# Patient Record
Sex: MALE | Race: WHITE | NOT HISPANIC OR LATINO | Employment: FULL TIME | URBAN - METROPOLITAN AREA
[De-identification: names, ages, dates, MRNs, and addresses within clinical notes are randomized per-mention and may not be internally consistent; named-entity substitution may affect disease eponyms.]

---

## 2024-03-22 ENCOUNTER — CLINICAL SUPPORT (OUTPATIENT)
Dept: AUDIOLOGY | Facility: CLINIC | Age: 59
End: 2024-03-22
Payer: COMMERCIAL

## 2024-03-22 DIAGNOSIS — H90.3 SENSORINEURAL HEARING LOSS (SNHL) OF BOTH EARS: Primary | ICD-10-CM

## 2024-03-22 DIAGNOSIS — H93.13 SUBJECTIVE TINNITUS OF BOTH EARS: ICD-10-CM

## 2024-03-22 PROCEDURE — HRANC PR HEARING AID NO CHARGE: Performed by: AUDIOLOGIST

## 2024-03-22 NOTE — PROGRESS NOTES
[facility]UDIOLOGY  6 MONTH HEARING AID CHECK      Name:  Berto Rivers  YOB: 1965  Today's date:  03/22/24      PATIENT ISSUES/CONCERNS AND OTOSCOPIC RESULTS  Otoscopic was clear.  Patient reports that he has been doing well with the hearing aids.    HEARING AID INSPECTION AND ADJUSTMENT  Hearing aids were cleaned and checked.  Replaced filters, domes and sport locks.  Listening check was good.        Patient reports no issues or concerns.  No need for adjustment today.    FOLLOW UP   The patient was asked to contact the office if they notice any significant change in hearing level or hearing aid function.    Otherwise, will follow up again in 6 months with a hearing test.    APPOINTMENT TIME  8:30-9:00am    Devin Blanc  Doctor of Audiology  Senior Audiologist

## 2024-09-26 ENCOUNTER — APPOINTMENT (OUTPATIENT)
Dept: AUDIOLOGY | Facility: CLINIC | Age: 59
End: 2024-09-26
Payer: COMMERCIAL